# Patient Record
Sex: FEMALE | Race: BLACK OR AFRICAN AMERICAN | NOT HISPANIC OR LATINO | Employment: OTHER | URBAN - METROPOLITAN AREA
[De-identification: names, ages, dates, MRNs, and addresses within clinical notes are randomized per-mention and may not be internally consistent; named-entity substitution may affect disease eponyms.]

---

## 2022-06-09 DIAGNOSIS — S43.432D SUPERIOR GLENOID LABRUM LESION OF LEFT SHOULDER, SUBSEQUENT ENCOUNTER: Primary | ICD-10-CM

## 2023-03-24 ENCOUNTER — TELEPHONE (OUTPATIENT)
Dept: ORTHOPEDICS | Facility: CLINIC | Age: 68
End: 2023-03-24
Payer: MEDICARE

## 2023-03-24 NOTE — TELEPHONE ENCOUNTER
Called patient, no answer, left message on voicemail.      ----- Message from Laura Stover sent at 3/24/2023 11:55 AM CDT -----  Regarding: pt advice  Contact: [phone] or [phone]  Angelia Rondon calling regarding Patient Advice (message) for #would like to see a provider who does ortho scopic surgery. Please call

## 2023-03-29 ENCOUNTER — TELEPHONE (OUTPATIENT)
Dept: ORTHOPEDICS | Facility: CLINIC | Age: 68
End: 2023-03-29
Payer: MEDICARE

## 2023-03-29 DIAGNOSIS — M25.552 BILATERAL HIP PAIN: Primary | ICD-10-CM

## 2023-03-29 DIAGNOSIS — M25.551 BILATERAL HIP PAIN: Primary | ICD-10-CM

## 2023-03-29 NOTE — TELEPHONE ENCOUNTER
Left a voice mail for pt to return my call regarding her appointment tomorrow with Dr Ochoa.  Need to schedule pt for bilateral hip xrays prior to her appointment  
person

## 2023-03-29 NOTE — TELEPHONE ENCOUNTER
----- Message from Laura Stover sent at 3/29/2023  3:40 PM CDT -----  Regarding: missed call  Contact: [phone]  Angelia Vale calling regarding Patient Advice (message) for #returning a missed call from Ana Rosa. Please call

## 2023-03-30 ENCOUNTER — HOSPITAL ENCOUNTER (OUTPATIENT)
Dept: RADIOLOGY | Facility: HOSPITAL | Age: 68
Discharge: HOME | End: 2023-03-30
Attending: ORTHOPAEDIC SURGERY
Payer: MEDICARE

## 2023-03-30 ENCOUNTER — OFFICE VISIT (OUTPATIENT)
Dept: ORTHOPEDICS | Facility: CLINIC | Age: 68
End: 2023-03-30
Payer: MEDICARE

## 2023-03-30 VITALS — BODY MASS INDEX: 23.54 KG/M2 | HEIGHT: 67 IN | WEIGHT: 150 LBS

## 2023-03-30 DIAGNOSIS — M16.0 PRIMARY OSTEOARTHRITIS OF BOTH HIPS: Primary | ICD-10-CM

## 2023-03-30 DIAGNOSIS — M25.552 BILATERAL HIP PAIN: ICD-10-CM

## 2023-03-30 DIAGNOSIS — M25.551 BILATERAL HIP PAIN: ICD-10-CM

## 2023-03-30 PROCEDURE — 99203 PR OFFICE/OUTPT VISIT, NEW, LEVL III, 30-44 MIN: ICD-10-PCS | Mod: S$PBB,,, | Performed by: ORTHOPAEDIC SURGERY

## 2023-03-30 PROCEDURE — 73521 XR HIPS BILATERAL 2 VIEW INCL AP PELVIS: ICD-10-PCS | Mod: 26,,, | Performed by: RADIOLOGY

## 2023-03-30 PROCEDURE — 73521 X-RAY EXAM HIPS BI 2 VIEWS: CPT | Mod: 26,,, | Performed by: RADIOLOGY

## 2023-03-30 PROCEDURE — 73521 X-RAY EXAM HIPS BI 2 VIEWS: CPT | Mod: TC

## 2023-03-30 PROCEDURE — 99203 OFFICE O/P NEW LOW 30 MIN: CPT | Mod: S$PBB,,, | Performed by: ORTHOPAEDIC SURGERY

## 2023-03-30 PROCEDURE — 99999 PR PBB SHADOW E&M-EST. PATIENT-LVL III: ICD-10-PCS | Mod: PBBFAC,,, | Performed by: ORTHOPAEDIC SURGERY

## 2023-03-30 PROCEDURE — 99213 OFFICE O/P EST LOW 20 MIN: CPT | Mod: PBBFAC | Performed by: ORTHOPAEDIC SURGERY

## 2023-03-30 PROCEDURE — 99999 PR PBB SHADOW E&M-EST. PATIENT-LVL III: CPT | Mod: PBBFAC,,, | Performed by: ORTHOPAEDIC SURGERY

## 2023-03-30 RX ORDER — NAPROXEN 500 MG/1
500 TABLET ORAL 2 TIMES DAILY WITH MEALS
COMMUNITY

## 2023-03-30 RX ORDER — CELECOXIB 200 MG/1
200 CAPSULE ORAL
COMMUNITY
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
## 2024-10-07 ENCOUNTER — HOSPITAL ENCOUNTER (OUTPATIENT)
Dept: RADIOLOGY | Facility: HOSPITAL | Age: 69
Discharge: HOME | End: 2024-10-07
Attending: ORTHOPAEDIC SURGERY
Payer: MEDICARE

## 2024-10-07 ENCOUNTER — OFFICE VISIT (OUTPATIENT)
Dept: SPORTS MEDICINE | Facility: CLINIC | Age: 69
End: 2024-10-07
Payer: MEDICARE

## 2024-10-07 VITALS
HEART RATE: 60 BPM | SYSTOLIC BLOOD PRESSURE: 143 MMHG | WEIGHT: 142 LBS | DIASTOLIC BLOOD PRESSURE: 75 MMHG | HEIGHT: 67 IN | BODY MASS INDEX: 22.29 KG/M2

## 2024-10-07 DIAGNOSIS — M25.512 LEFT SHOULDER PAIN, UNSPECIFIED CHRONICITY: ICD-10-CM

## 2024-10-07 DIAGNOSIS — M19.012 ARTHRITIS OF LEFT ACROMIOCLAVICULAR JOINT: ICD-10-CM

## 2024-10-07 DIAGNOSIS — M75.22 BICEPS TENDINITIS OF LEFT SHOULDER: Primary | ICD-10-CM

## 2024-10-07 DIAGNOSIS — G89.29 CHRONIC LEFT SHOULDER PAIN: ICD-10-CM

## 2024-10-07 DIAGNOSIS — M25.512 CHRONIC LEFT SHOULDER PAIN: ICD-10-CM

## 2024-10-07 PROCEDURE — 99204 OFFICE O/P NEW MOD 45 MIN: CPT | Mod: S$PBB,,, | Performed by: ORTHOPAEDIC SURGERY

## 2024-10-07 PROCEDURE — 73030 X-RAY EXAM OF SHOULDER: CPT | Mod: TC,LT

## 2024-10-07 PROCEDURE — 99204 OFFICE O/P NEW MOD 45 MIN: CPT | Mod: PBBFAC,25 | Performed by: ORTHOPAEDIC SURGERY

## 2024-10-07 PROCEDURE — 99999 PR PBB SHADOW E&M-NEW PATIENT-LVL IV: CPT | Mod: PBBFAC,,, | Performed by: ORTHOPAEDIC SURGERY

## 2024-10-07 PROCEDURE — 73030 X-RAY EXAM OF SHOULDER: CPT | Mod: 26,LT,, | Performed by: RADIOLOGY

## 2024-10-07 RX ORDER — CELECOXIB 200 MG/1
200 CAPSULE ORAL 2 TIMES DAILY
Qty: 60 CAPSULE | Refills: 6 | Status: SHIPPED | OUTPATIENT
Start: 2024-10-07 | End: 2025-05-05
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
## 2024-10-14 ENCOUNTER — CLINICAL SUPPORT (OUTPATIENT)
Dept: REHABILITATION | Facility: HOSPITAL | Age: 69
End: 2024-10-14
Attending: ORTHOPAEDIC SURGERY
Payer: MEDICARE

## 2024-10-14 DIAGNOSIS — R68.89 IMPAIRED FUNCTION OF UPPER EXTREMITY: Primary | ICD-10-CM

## 2024-10-14 DIAGNOSIS — G89.29 CHRONIC LEFT SHOULDER PAIN: ICD-10-CM

## 2024-10-14 DIAGNOSIS — M25.512 CHRONIC LEFT SHOULDER PAIN: ICD-10-CM

## 2024-10-14 PROCEDURE — 97110 THERAPEUTIC EXERCISES: CPT | Mod: PN

## 2024-10-14 PROCEDURE — 97161 PT EVAL LOW COMPLEX 20 MIN: CPT | Mod: PN
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
## 2024-10-25 ENCOUNTER — CLINICAL SUPPORT (OUTPATIENT)
Dept: REHABILITATION | Facility: HOSPITAL | Age: 69
End: 2024-10-25
Payer: MEDICARE

## 2024-10-25 DIAGNOSIS — R68.89 IMPAIRED FUNCTION OF UPPER EXTREMITY: Primary | ICD-10-CM

## 2024-10-25 PROCEDURE — 97161 PT EVAL LOW COMPLEX 20 MIN: CPT | Mod: PN

## 2024-10-25 PROCEDURE — 97110 THERAPEUTIC EXERCISES: CPT | Mod: PN

## 2024-10-25 PROCEDURE — 97112 NEUROMUSCULAR REEDUCATION: CPT | Mod: KX,PN

## 2024-10-25 NOTE — PROGRESS NOTES
Physical Therapy Daily Treatment Note     Name: Angelia Vale  Clinic Number: [id number]    Therapy Diagnosis:   Encounter Diagnosis   Name Primary?    Impaired function of upper extremity Yes     Physician: Diane Elkins MD    Visit Date: 10/25/2024    Physician orders: PT Eval and Treat   Medical diagnosis from referral:   Free Text Diagnosis   M25.512,G89.29 (ICD-10-CM) - Chronic left shoulder pain   Evaluation date: 10/14/2024  Authorization period expiration: 10/7/24  Plan of care expiration: 12/23/2024  Reassessment due: 11/14/2024   Visit # / visits authorized: 1/1    Time In: 1300  Time Out: 1355    Total Billable Time: 53 minutes    Precautions: Standard    Subjective     Pt reports: no significant changes since initial visit.  She was compliant with home exercise program.  Response to previous treatment:  fair  Functional change: none    Pain: 4/10  Location: left shoulder      Objective     Patient received neuromuscular re-education activities to improve: Coordination, Kinesthetic, Sense, Proprioception, and Posture for 15 minutes. The following activities were included:     4 way GH isometrics 2 x 10    Pt received  therapeutic exercises to develop strength, endurance, ROM, and flexibility x 38 min      Manual GH PROM  Bilateral shoulder ext/rows 3 x 10 YTB  Supine wand flexion x 15  Upper trap stretch 20 sec x 4  Seated thoracic ext vs towel roll x 20  Scapular retraction 3 x 10  SL ER 3 x 10 2lbs    Home Exercises and Patient Education Provided     Education provided:   - PT role and POC  - HEP    Written Home Exercises Provided: Patient instructed to cont prior HEP.  Exercises were reviewed and Angelia was able to demonstrate them prior to the end of the session.  Angelia demonstrated good  understanding of the education provided.     See EMR under Patient Instructions for exercises provided 10/25/2024.    Assessment     Pt requires min cueing with postural correction with prescribed therex.  No  c/o increased discomfort with prescribed activities.  Good response to exercise progression consisting of RTC and scapular stabilization activities. Angelia is progressing well towards her goals.     Pt prognosis is Good.     Pt will continue to benefit from skilled outpatient physical therapy to address the deficits listed in the problem list box on initial evaluation, provide pt/family education and to maximize pt's level of independence in the home and community environment.     Pt's spiritual, cultural and educational needs considered and pt agreeable to plan of care and goals.     Anticipated barriers to physical therapy: none  GOALS:      Short Term Goals: 4 weeks  - Report decreased in pain at worse less than  <   / =  3  /10  to increase tolerance for functional mobility. Appropriate and ongoing  - Pt to improve range of motion by 25% to allow for improved functional mobility to allow for improvement in IADLs. Appropriate and ongoing  - Increased LUE MMT 1/2 grade to increase tolerance for ADL and work activities. Appropriate and ongoing  - Pt to report be conscious of impaired sitting and standing posture daily to decrease pain. Appropriate and ongoing  - Pt to tolerate HEP to improve ROM and independence with ADL's. Appropriate and ongoing     Long Term Goals: 8 weeks  - Report decreased in pain at worse less than  <   / =  0  /10  to increase tolerance for functional mobility. Appropriate and ongoing  - Patient will demonstrate improved overall function per FOTO Survey to 55% score or less. Appropriate and ongoing  - Increased LUE MMT 1 grade to increase tolerance for ADL and work activities. Appropriate and ongoing  - Pt to report and demonstrate proper posture in standing and sitting to decrease pain. Appropriate and ongoing  - Pt to be Independent with HEP to improve ROM and independence with ADL's. Appropriate and ongoing  - Pt to improve range of motion by 75% to allow for improved functional  mobility to allow for improvement in IADLs. Appropriate and ongoing    Plan     Continue with established  PT Plan of Care towards patient goals.      Gerard Humphrey, PT

## 2024-10-28 ENCOUNTER — CLINICAL SUPPORT (OUTPATIENT)
Dept: REHABILITATION | Facility: HOSPITAL | Age: 69
End: 2024-10-28
Payer: MEDICARE

## 2024-10-28 DIAGNOSIS — R68.89 IMPAIRED FUNCTION OF UPPER EXTREMITY: Primary | ICD-10-CM

## 2024-10-28 PROCEDURE — 97112 NEUROMUSCULAR REEDUCATION: CPT | Mod: PN,CQ

## 2024-10-28 PROCEDURE — 97110 THERAPEUTIC EXERCISES: CPT | Mod: PN,CQ

## 2024-11-04 ENCOUNTER — CLINICAL SUPPORT (OUTPATIENT)
Dept: REHABILITATION | Facility: HOSPITAL | Age: 69
End: 2024-11-04
Payer: MEDICARE

## 2024-11-04 DIAGNOSIS — R68.89 IMPAIRED FUNCTION OF UPPER EXTREMITY: Primary | ICD-10-CM

## 2024-11-04 DIAGNOSIS — M25.512 CHRONIC LEFT SHOULDER PAIN: ICD-10-CM

## 2024-11-04 DIAGNOSIS — G89.29 CHRONIC LEFT SHOULDER PAIN: ICD-10-CM

## 2024-11-04 PROCEDURE — 97112 NEUROMUSCULAR REEDUCATION: CPT | Mod: KX,PN,CQ

## 2024-11-04 NOTE — PROGRESS NOTES
"  Physical Therapy Daily Treatment Note     Name: Angelia Vale  Clinic Number: [id number]    Therapy Diagnosis:   Encounter Diagnoses   Name Primary?    Impaired function of upper extremity Yes    Chronic left shoulder pain      Physician: Diane Elkins MD    Visit Date: 11/4/2024    Physician orders: PT Eval and Treat   Medical diagnosis from referral:   Free Text Diagnosis   M25.512,G89.29 (ICD-10-CM) - Chronic left shoulder pain   Evaluation date: 10/14/2024  Authorization period expiration: 10/7/24  Plan of care expiration: 12/23/2024  Reassessment due: 11/14/2024   Visit # / visits authorized: 1/1    Time In: 1300  Time Out: 1355    Total Billable Time: 30 minutes 1:1  Total Time: 55 minutes    Precautions: Standard    Subjective     Pt reports: a little sore to Left shoulder, she says she must have sleeping on that side. She says depend of the movements, sometimes it can be 0/10 sometimes it's 10/10. She says she will have MRI to her Left shoulder this coming Wednesday.  She was compliant with home exercise program.  Response to previous treatment:  fair  Functional change: none    Pain: 0-10/10  Location: left shoulder      Objective     Patient received neuromuscular re-education activities to improve: Coordination, Kinesthetic, Sense, Proprioception, and Posture for 23 minutes. The following activities were included:     4 way GH isometrics +3x10x3"  Isometric IR 2x10x3"  Isometric ER 2x10x3"    Pt received  therapeutic exercises to develop strength, endurance, ROM, and flexibility x 33 min      Manual GH PROM  Bilateral shoulder ext/rows 3x10x3" RTB  Supine shoulder flexion with 2# wand 3x10  +Supine Chest press with 2# wand 3x10  +Supine Horizontal Abduction with RTB 2x10    Not perform this visit due to time:  SL ER 3x10 with 2lbs dumbbell  Upper trap stretch 20 sec x 4  Seated thoracic ext vs towel roll x 20  Scapular retraction 3 x 10      Home Exercises and Patient Education Provided     Education " provided:   - PT role and POC  - HEP    Written Home Exercises Provided: Patient instructed to cont prior HEP.  Exercises were reviewed and Angelia was able to demonstrate them prior to the end of the session.  Angelia demonstrated good  understanding of the education provided.     See EMR under Patient Instructions for exercises provided 11/4/2024.    Assessment     Patient presents to clinic with reporting a little sore to Left shoulder, she says she must have sleeping on that side. She says depend of the movements, sometimes it can be 0/10 sometimes it's 10/10. She says she will have MRI to her Left shoulder this coming Wednesday. Pt requires min cueing with postural correction with prescribed therex. No c/o increased discomfort with prescribed activities. Good response to exercise progression consisting of RTC and scapular stabilization activities. Patient tolerated session well with some soreness pain from exercising post treatment. Will cont to progress per patient tolerance. Angelia is progressing well towards her goals.     Pt prognosis is Good.     Pt will continue to benefit from skilled outpatient physical therapy to address the deficits listed in the problem list box on initial evaluation, provide pt/family education and to maximize pt's level of independence in the home and community environment.     Pt's spiritual, cultural and educational needs considered and pt agreeable to plan of care and goals.     Anticipated barriers to physical therapy: none  GOALS:      Short Term Goals: 4 weeks  - Report decreased in pain at worse less than  <   / =  3  /10  to increase tolerance for functional mobility. Appropriate and ongoing  - Pt to improve range of motion by 25% to allow for improved functional mobility to allow for improvement in IADLs. Appropriate and ongoing  - Increased LUE MMT 1/2 grade to increase tolerance for ADL and work activities. Appropriate and ongoing  - Pt to report be conscious of  impaired sitting and standing posture daily to decrease pain. Appropriate and ongoing  - Pt to tolerate HEP to improve ROM and independence with ADL's. Appropriate and ongoing     Long Term Goals: 8 weeks  - Report decreased in pain at worse less than  <   / =  0  /10  to increase tolerance for functional mobility. Appropriate and ongoing  - Patient will demonstrate improved overall function per FOTO Survey to 55% score or less. Appropriate and ongoing  - Increased LUE MMT 1 grade to increase tolerance for ADL and work activities. Appropriate and ongoing  - Pt to report and demonstrate proper posture in standing and sitting to decrease pain. Appropriate and ongoing  - Pt to be Independent with HEP to improve ROM and independence with ADL's. Appropriate and ongoing  - Pt to improve range of motion by 75% to allow for improved functional mobility to allow for improvement in IADLs. Appropriate and ongoing    Plan     Continue with established  PT Plan of Care towards patient goals.      Osmar To, PTA    11/4/2024

## 2024-11-07 ENCOUNTER — HOSPITAL ENCOUNTER (OUTPATIENT)
Dept: RADIOLOGY | Facility: HOSPITAL | Age: 69
Discharge: HOME | End: 2024-11-07
Attending: ORTHOPAEDIC SURGERY
Payer: MEDICARE

## 2024-11-07 DIAGNOSIS — M25.512 CHRONIC LEFT SHOULDER PAIN: ICD-10-CM

## 2024-11-07 DIAGNOSIS — G89.29 CHRONIC LEFT SHOULDER PAIN: ICD-10-CM

## 2024-11-07 PROCEDURE — 73222 MRI JOINT UPR EXTREM W/DYE: CPT | Mod: TC,LT

## 2024-11-07 PROCEDURE — 73222 MRI JOINT UPR EXTREM W/DYE: CPT | Mod: 26,LT,, | Performed by: RADIOLOGY

## 2024-11-07 PROCEDURE — A9585 GADOBUTROL INJECTION: HCPCS | Performed by: ORTHOPAEDIC SURGERY

## 2024-11-07 PROCEDURE — 25500020 PHARM REV CODE 255: Performed by: ORTHOPAEDIC SURGERY

## 2024-11-07 PROCEDURE — 63600175 PHARM REV CODE 636 W HCPCS: Performed by: ORTHOPAEDIC SURGERY

## 2024-11-07 RX ORDER — GADOBUTROL 604.72 MG/ML
7 INJECTION INTRAVENOUS
Status: COMPLETED | OUTPATIENT
Start: 2024-11-07 | End: 2024-11-07

## 2024-11-07 RX ORDER — LIDOCAINE HYDROCHLORIDE 10 MG/ML
2 INJECTION, SOLUTION INFILTRATION; PERINEURAL ONCE
Status: COMPLETED | OUTPATIENT
Start: 2024-11-07 | End: 2024-11-07

## 2024-11-07 RX ADMIN — IOHEXOL 9 ML: 300 INJECTION, SOLUTION INTRAVENOUS at 03:11

## 2024-11-07 RX ADMIN — LIDOCAINE HYDROCHLORIDE 2 ML: 10 INJECTION, SOLUTION INFILTRATION; PERINEURAL at 03:11

## 2024-11-07 RX ADMIN — GADOBUTROL 7 ML: 604.72 INJECTION INTRAVENOUS at 03:11

## 2024-11-08 ENCOUNTER — CLINICAL SUPPORT (OUTPATIENT)
Dept: REHABILITATION | Facility: HOSPITAL | Age: 69
End: 2024-11-08
Payer: MEDICARE

## 2024-11-08 DIAGNOSIS — G89.29 CHRONIC LEFT SHOULDER PAIN: Primary | ICD-10-CM

## 2024-11-08 DIAGNOSIS — M25.512 CHRONIC LEFT SHOULDER PAIN: Primary | ICD-10-CM

## 2024-11-08 DIAGNOSIS — R68.89 IMPAIRED FUNCTION OF UPPER EXTREMITY: ICD-10-CM

## 2024-11-08 PROCEDURE — 97110 THERAPEUTIC EXERCISES: CPT | Mod: KX,PN,CQ

## 2024-11-08 PROCEDURE — 97112 NEUROMUSCULAR REEDUCATION: CPT | Mod: KX,PN,CQ

## 2024-11-08 NOTE — PROGRESS NOTES
"  Physical Therapy Daily Treatment Note     Name: Angelia Vale  Clinic Number: [id number]    Therapy Diagnosis:   Encounter Diagnoses   Name Primary?    Impaired function of upper extremity     Chronic left shoulder pain Yes     Physician: Diane Elkins MD    Visit Date: 11/8/2024    Physician orders: PT Eval and Treat   Medical diagnosis from referral:   Free Text Diagnosis   M25.512,G89.29 (ICD-10-CM) - Chronic left shoulder pain   Evaluation date: 10/14/2024  Authorization period expiration: 10/7/24  Plan of care expiration: 12/23/2024  Reassessment due: 11/14/2024   Visit # / visits authorized: 4/20    Time In: 1300  Time Out: 1325    Total Billable Time: 25 minutes 1:1  Total Time: 25 minutes    Precautions: Standard    Subjective     Pt reports: patient has to leave early today. Patient states she got her MRI yesterday and found out that she has a tear to the shoulder, but she wants to schedule an appointment with her MD to discuss further treatment options for her left shoulder. She still has pain depending on certain angle she moves her shoulder.   She was compliant with home exercise program.  Response to previous treatment:  fair  Functional change: none    Pain: 0-10/10  Location: left shoulder      Objective     Patient received neuromuscular re-education activities to improve: Coordination, Kinesthetic, Sense, Proprioception, and Posture for 10 minutes. The following activities were included:     4 way GH isometrics +3x10x3"  Isometric IR 2x10x3"  Isometric ER 2x10x3"    Pt received  therapeutic exercises to develop strength, endurance, ROM, and flexibility x 15 min      Manual GH PROM = not performed   Bilateral shoulder ext/rows 3x10x3" RTB = not performed  Supine shoulder flexion with 2# wand 3x10  +Supine Chest press with 2# wand 3x10  +Supine Horizontal Abduction with RTB 2x10    Not perform this visit due to time:  SL ER 3x10 with 2lbs dumbbell  Upper trap stretch 20 sec x 4  Seated thoracic " ext vs towel roll x 20  Scapular retraction 3 x 10      Home Exercises and Patient Education Provided     Education provided:   - PT role and POC  - HEP    Written Home Exercises Provided: Patient instructed to cont prior HEP.  Exercises were reviewed and Angelia was able to demonstrate them prior to the end of the session.  Angelia demonstrated good  understanding of the education provided.     See EMR under Patient Instructions for exercises provided 11/8/2024.    Assessment   Patient recently had an MRI done yesterday and she found out that she has tear by reading the results herself. Advised patient to call and schedule with her MD to discuss her MRI results and plan for future treatment in regarding to her left shoulder pain. Due to patient has to leave early today, therefore, not all exercises were performed. Verbal cues and demonstration for proper techniques in standing isometric exercise specifically in extension. Otherwise, will add more exercises and perform more next visit as appropriate.     Angelia is progressing well towards her goals.   Pt prognosis is Good.     Pt will continue to benefit from skilled outpatient physical therapy to address the deficits listed in the problem list box on initial evaluation, provide pt/family education and to maximize pt's level of independence in the home and community environment.     Pt's spiritual, cultural and educational needs considered and pt agreeable to plan of care and goals.     Anticipated barriers to physical therapy: none  GOALS:      Short Term Goals: 4 weeks  - Report decreased in pain at worse less than  <   / =  3  /10  to increase tolerance for functional mobility. Appropriate and ongoing  - Pt to improve range of motion by 25% to allow for improved functional mobility to allow for improvement in IADLs. Appropriate and ongoing  - Increased LUE MMT 1/2 grade to increase tolerance for ADL and work activities. Appropriate and ongoing  - Pt to report  be conscious of impaired sitting and standing posture daily to decrease pain. Appropriate and ongoing  - Pt to tolerate HEP to improve ROM and independence with ADL's. Appropriate and ongoing     Long Term Goals: 8 weeks  - Report decreased in pain at worse less than  <   / =  0  /10  to increase tolerance for functional mobility. Appropriate and ongoing  - Patient will demonstrate improved overall function per FOTO Survey to 55% score or less. Appropriate and ongoing  - Increased LUE MMT 1 grade to increase tolerance for ADL and work activities. Appropriate and ongoing  - Pt to report and demonstrate proper posture in standing and sitting to decrease pain. Appropriate and ongoing  - Pt to be Independent with HEP to improve ROM and independence with ADL's. Appropriate and ongoing  - Pt to improve range of motion by 75% to allow for improved functional mobility to allow for improvement in IADLs. Appropriate and ongoing    Plan     Continue with established  PT Plan of Care towards patient goals.      Kary Arora, PTA    11/08/2024

## 2024-11-12 ENCOUNTER — CLINICAL SUPPORT (OUTPATIENT)
Dept: REHABILITATION | Facility: HOSPITAL | Age: 69
End: 2024-11-12
Payer: MEDICARE

## 2024-11-12 DIAGNOSIS — R68.89 IMPAIRED FUNCTION OF UPPER EXTREMITY: Primary | ICD-10-CM

## 2024-11-12 PROCEDURE — 97112 NEUROMUSCULAR REEDUCATION: CPT | Mod: PN

## 2024-11-12 PROCEDURE — 97110 THERAPEUTIC EXERCISES: CPT | Mod: PN

## 2024-11-12 NOTE — PROGRESS NOTES
"Physical Therapy Daily Treatment Note     Name: Angelia Vale  Clinic Number: [id number]    Therapy Diagnosis:   Encounter Diagnosis   Name Primary?    Impaired function of upper extremity Yes     Physician: Diane Elkins MD    Visit Date: 11/12/2024    Physician orders: PT Eval and Treat   Medical diagnosis from referral:   Free Text Diagnosis   M25.512,G89.29 (ICD-10-CM) - Chronic left shoulder pain   Evaluation date: 10/14/2024  Authorization period expiration: 10/7/24  Plan of care expiration: 12/23/2024  Reassessment due: 12/14/2024   Visit # / visits authorized: 5/20    Time In: 1300  Time Out: 1355    Total Billable Time: 55 minutes 1:1  Total Time: 25 minutes    Precautions: Standard    Subjective     Pt reports: continued pain from shoulder. It is intermittent and varies in severity.       She was compliant with home exercise program.  Response to previous treatment:  fair  Functional change: none    Pain: 0-10/10  Location: left shoulder      Objective     Shoulder Range of Motion:   ACTIVE ROM LEFT RIGHT   Flexion 160 160   Abduction 90 150   IR Functional T8 Functional T6   ER Functional  Functional       PASSIVE ROM LEFT RIGHT   Flexion 180 WFL   Abduction 120 WFL   IR/90deg 40 WFL   ER/90deg 90 WFL      STRENGTH LEFT RIGHT   Flexion 3+/5 5/5   Abduction 3+/5 5/5   Extension 3+/5 5/5   IR (neutral) 3+/5 5/5   ER (neutral) 3+/5 5/5   Middle Trap 4/5 4/5   Lower Trap 4/5 4/5   Elbow Flexion 4/5 4/5       Patient received neuromuscular re-education activities to improve: Coordination, Kinesthetic, Sense, Proprioception, and Posture for 15 minutes. The following activities were included:     4 way GH isometrics 3x10x3"  Isometric IR 2x10x3"  Isometric ER 2x10x3"    Pt received  therapeutic exercises to develop strength, endurance, ROM, and flexibility x 40 min      Manual GH PROM = not performed   Bilateral shoulder ext/rows 3x10x3" RTB = not performed  Supine shoulder flexion with 2# wand 3x10  Supine " Chest press with 2# wand 3x10  Supine Horizontal Abduction with RTB 2x10  SL ER 3x10 with 2lbs dumbbell  Upper trap stretch 20 sec x 4  Seated thoracic ext vs towel roll x 20  Scapular retraction 3 x 10      Home Exercises and Patient Education Provided     Education provided:   - PT role and POC  - HEP    Written Home Exercises Provided: Patient instructed to cont prior HEP.  Exercises were reviewed and Angelia was able to demonstrate them prior to the end of the session.  Angelia demonstrated good  understanding of the education provided.     See EMR under Patient Instructions for exercises provided 11/12/2024.    Assessment     Angelia tolerated treatment well today. Presents to clinic reporting continued pain that varies in severity. Re-measured ROM and strength with modest improvements at this time. Continued prior interventions to help improve stability and mobility of GH joint. Will continue to progress treatment per patient tolerance.      Angelia is progressing well towards her goals.   Pt prognosis is Good.     Pt will continue to benefit from skilled outpatient physical therapy to address the deficits listed in the problem list box on initial evaluation, provide pt/family education and to maximize pt's level of independence in the home and community environment.     Pt's spiritual, cultural and educational needs considered and pt agreeable to plan of care and goals.     Anticipated barriers to physical therapy: none  GOALS:      Short Term Goals: 4 weeks  - Report decreased in pain at worse less than  <   / =  3  /10  to increase tolerance for functional mobility. Appropriate and ongoing  - Pt to improve range of motion by 25% to allow for improved functional mobility to allow for improvement in IADLs. Appropriate and ongoing  - Increased LUE MMT 1/2 grade to increase tolerance for ADL and work activities. Appropriate and ongoing  - Pt to report be conscious of impaired sitting and standing posture daily  to decrease pain. Appropriate and ongoing  - Pt to tolerate HEP to improve ROM and independence with ADL's. Appropriate and ongoing     Long Term Goals: 8 weeks  - Report decreased in pain at worse less than  <   / =  0  /10  to increase tolerance for functional mobility. Appropriate and ongoing  - Patient will demonstrate improved overall function per FOTO Survey to 55% score or less. Appropriate and ongoing  - Increased LUE MMT 1 grade to increase tolerance for ADL and work activities. Appropriate and ongoing  - Pt to report and demonstrate proper posture in standing and sitting to decrease pain. Appropriate and ongoing  - Pt to be Independent with HEP to improve ROM and independence with ADL's. Appropriate and ongoing  - Pt to improve range of motion by 75% to allow for improved functional mobility to allow for improvement in IADLs. Appropriate and ongoing    Plan     Continue with established  PT Plan of Care towards patient goals.      Corbin Haddad, PT    11/12/2024

## 2024-11-19 ENCOUNTER — CLINICAL SUPPORT (OUTPATIENT)
Dept: REHABILITATION | Facility: HOSPITAL | Age: 69
End: 2024-11-19
Payer: MEDICARE

## 2024-11-19 DIAGNOSIS — R68.89 IMPAIRED FUNCTION OF UPPER EXTREMITY: ICD-10-CM

## 2024-11-19 DIAGNOSIS — M25.512 CHRONIC LEFT SHOULDER PAIN: Primary | ICD-10-CM

## 2024-11-19 DIAGNOSIS — G89.29 CHRONIC LEFT SHOULDER PAIN: Primary | ICD-10-CM

## 2024-11-19 PROCEDURE — 97530 THERAPEUTIC ACTIVITIES: CPT | Mod: KX,PN,CQ

## 2024-11-19 PROCEDURE — 97110 THERAPEUTIC EXERCISES: CPT | Mod: KX,PN,CQ

## 2024-11-19 PROCEDURE — 97112 NEUROMUSCULAR REEDUCATION: CPT | Mod: KX,PN,CQ

## 2024-11-19 NOTE — PROGRESS NOTES
"  Physical Therapy Daily Treatment Note     Name: Angelia Vale  Clinic Number: [id number]    Therapy Diagnosis:   Encounter Diagnoses   Name Primary?    Impaired function of upper extremity     Chronic left shoulder pain Yes     Physician: Diane Elkins MD    Visit Date: 11/19/2024    Physician orders: PT Eval and Treat   Medical diagnosis from referral:   Free Text Diagnosis   M25.512,G89.29 (ICD-10-CM) - Chronic left shoulder pain   Evaluation date: 10/14/2024  Authorization period expiration: 10/7/24  Plan of care expiration: 12/23/2024  Reassessment due: 11/14/2024   Visit # / visits authorized: 6/20    Time In: 1302  Time Out: 1345    Total Billable Time: 43 minutes 1:1 PTA  Total Time: 43 minutes    Precautions: Standard    Subjective     Pt reports: her left shoulder feels about the same, no improvement or changes. She has a follow up with her doctor on the 25th of this month to discuss about the MRI results and treatment options. Right now, she has some pain going down to the left elbow, not sure why.   She was compliant with home exercise program.  Response to previous treatment: about the same, no improvement or changes from last visit   Functional change: none    Pain: 3/10  Location: left shoulder      Objective     Angelia participated in dynamic functional therapeutic activities to improve functional performance for 8  minutes, including:    UBE as a warm up for 4 minutes forward/ 4 minutes backwards = total of 8 minutes       Patient received neuromuscular re-education activities to improve: Coordination, Kinesthetic, Sense, Proprioception, and Posture for 15 minutes. The following activities were included:     4 way GH isometrics 2x10x3"  Pendulum in circles for 60 seconds each direction   Standing Bilateral shoulder ext/rows 2x10x3" RTB       Pt received  therapeutic exercises to develop strength, endurance, ROM, and flexibility x 20 min      Supine shoulder flexion with 2# wand 2x10  Supine " Chest press with 2# wand 2x10  Supine serratus punches wand 2#, 2x10  Supine Horizontal Abduction with RTB 2x10      May resume or add exercise next visit:   Standing wall slide   Seated thoracic ext vs towel roll x 20      Angelia received the following manual therapy techniques: Joint mobilizations and Soft tissue Mobilization were applied to the: left shoulder for 00 minutes, including:  None performed this visit         Home Exercises and Patient Education Provided     Education provided:   - PT role and POC  - HEP    Written Home Exercises Provided: Patient instructed to cont prior HEP.  Exercises were reviewed and Angelia was able to demonstrate them prior to the end of the session.  Angelia demonstrated good  understanding of the education provided.     See EMR under Patient Instructions for exercises provided 11/19/2024.    Assessment   Patient expressed that her left shoulder remains the same, no major changes. She does have a follow up with her MD regarding the MRI results and discuss about treatment options.     At this time, we will still work on active assistive shoulder exercises and postural exercise to improve her symptoms. Verbal cues for proper techniques in isometrics exercise and standing rows for middle trap muscles activation. No major increase in pain to left shoulder post therapy. Will continue to progress patient as tolerated.     Angelia is progressing slowly towards her goals.   Pt prognosis is Good.     Pt will continue to benefit from skilled outpatient physical therapy to address the deficits listed in the problem list box on initial evaluation, provide pt/family education and to maximize pt's level of independence in the home and community environment.     Pt's spiritual, cultural and educational needs considered and pt agreeable to plan of care and goals.     Anticipated barriers to physical therapy: none  GOALS:      Short Term Goals: 4 weeks  - Report decreased in pain at worse less  than  <   / =  3  /10  to increase tolerance for functional mobility. Appropriate and ongoing  - Pt to improve range of motion by 25% to allow for improved functional mobility to allow for improvement in IADLs. Appropriate and ongoing  - Increased LUE MMT 1/2 grade to increase tolerance for ADL and work activities. Appropriate and ongoing  - Pt to report be conscious of impaired sitting and standing posture daily to decrease pain. Appropriate and ongoing  - Pt to tolerate HEP to improve ROM and independence with ADL's. Appropriate and ongoing     Long Term Goals: 8 weeks  - Report decreased in pain at worse less than  <   / =  0  /10  to increase tolerance for functional mobility. Appropriate and ongoing  - Patient will demonstrate improved overall function per FOTO Survey to 55% score or less. Appropriate and ongoing  - Increased LUE MMT 1 grade to increase tolerance for ADL and work activities. Appropriate and ongoing  - Pt to report and demonstrate proper posture in standing and sitting to decrease pain. Appropriate and ongoing  - Pt to be Independent with HEP to improve ROM and independence with ADL's. Appropriate and ongoing  - Pt to improve range of motion by 75% to allow for improved functional mobility to allow for improvement in IADLs. Appropriate and ongoing    Plan     Continue with established  PT Plan of Care towards patient goals.      Kary Arora, PTA    11/19/2024

## 2024-11-22 ENCOUNTER — CLINICAL SUPPORT (OUTPATIENT)
Dept: REHABILITATION | Facility: HOSPITAL | Age: 69
End: 2024-11-22
Payer: MEDICARE

## 2024-11-22 DIAGNOSIS — R68.89 IMPAIRED FUNCTION OF UPPER EXTREMITY: ICD-10-CM

## 2024-11-22 DIAGNOSIS — M25.512 CHRONIC LEFT SHOULDER PAIN: Primary | ICD-10-CM

## 2024-11-22 DIAGNOSIS — G89.29 CHRONIC LEFT SHOULDER PAIN: Primary | ICD-10-CM

## 2024-11-22 PROCEDURE — 97110 THERAPEUTIC EXERCISES: CPT | Mod: KX,PN,CQ

## 2024-11-22 NOTE — PROGRESS NOTES
"  Physical Therapy Daily Treatment Note     Name: Angelia Vale  Clinic Number: [id number]    Therapy Diagnosis:   Encounter Diagnoses   Name Primary?    Impaired function of upper extremity     Chronic left shoulder pain Yes     Physician: Diane Elkins MD    Visit Date: 11/22/2024    Physician orders: PT Eval and Treat   Medical diagnosis from referral:   Free Text Diagnosis   M25.512,G89.29 (ICD-10-CM) - Chronic left shoulder pain   Evaluation date: 10/14/2024  Authorization period expiration: 10/7/24  Plan of care expiration: 12/23/2024  Reassessment due: 12/14/2024    Visit # / visits authorized: 7/20    Time In: 1303  Time Out: 13:52    Total Billable Time: 30 minutes 1:1 PTA  Total Time: 49 minutes    Precautions: Standard    Subjective     Pt reports: she's going to see her doctor on Monday regarding the left shoulder pain.   She was compliant with home exercise program.  Response to previous treatment: about the same, no improvement or changes from last visit   Functional change: none    Pain: 3/10  Location: left shoulder      Objective     Angelia participated in dynamic functional therapeutic activities to improve functional performance for 8  minutes, including:    UBE as a warm up for 4 minutes forward/ 4 minutes backwards = total of 8 minutes       Patient received neuromuscular re-education activities to improve: Coordination, Kinesthetic, Sense, Proprioception, and Posture for 15 minutes. The following activities were included:     4 way GH isometrics 2x10x3"  Pendulum in circles for 60 seconds each direction   Standing Bilateral shoulder ext/rows 2x10x3" GTB       Pt received  therapeutic exercises to develop strength, endurance, ROM, and flexibility x 26 min      Supine shoulder flexion with 2# wand 2x10  Supine Chest press with 2# wand 2x10  Supine serratus punches wand 2#, 2x10  Supine Horizontal Abduction with GTB 2x10  Seated thoracic ext vs towel roll x 20      Angelia received the " following manual therapy techniques: Joint mobilizations and Soft tissue Mobilization were applied to the: left shoulder for 00 minutes, including:  None performed this visit         Home Exercises and Patient Education Provided     Education provided:   - PT role and POC  - HEP    Written Home Exercises Provided: Patient instructed to cont prior HEP.  Exercises were reviewed and Angelia was able to demonstrate them prior to the end of the session.  Angelia demonstrated good  understanding of the education provided.     See EMR under Patient Instructions for exercises provided 11/22/2024.    Assessment   Patient expressed that her left shoulder remains the same, no major changes. She does have a follow up with her MD regarding the MRI results and discuss about treatment options.    Again, we will still work on active assistive shoulder exercises and postural exercise to improve her symptoms. Verbal cues for proper techniques in isometrics exercise and standing rows for middle trap muscles activation. Able to increase resistance this visit which patient appears to tolerate this without major pain in the shoulder. No major increase in pain to left shoulder post therapy. So far, it appears she is limited in AROM abduction to left shoulder. Shoulder flexion, external rotation, and internal rotation, appears to be within normal range. Will progress as tolerated and follow up with patient regarding her visit with her doctor.     Angelia is progressing slowly towards her goals.   Pt prognosis is Good.     Pt will continue to benefit from skilled outpatient physical therapy to address the deficits listed in the problem list box on initial evaluation, provide pt/family education and to maximize pt's level of independence in the home and community environment.     Pt's spiritual, cultural and educational needs considered and pt agreeable to plan of care and goals.     Anticipated barriers to physical therapy: none  GOALS:       Short Term Goals: 4 weeks  - Report decreased in pain at worse less than  <   / =  3  /10  to increase tolerance for functional mobility. Appropriate and ongoing  - Pt to improve range of motion by 25% to allow for improved functional mobility to allow for improvement in IADLs. Appropriate and ongoing  - Increased LUE MMT 1/2 grade to increase tolerance for ADL and work activities. Appropriate and ongoing  - Pt to report be conscious of impaired sitting and standing posture daily to decrease pain. Appropriate and ongoing  - Pt to tolerate HEP to improve ROM and independence with ADL's. Appropriate and ongoing     Long Term Goals: 8 weeks  - Report decreased in pain at worse less than  <   / =  0  /10  to increase tolerance for functional mobility. Appropriate and ongoing  - Patient will demonstrate improved overall function per FOTO Survey to 55% score or less. Appropriate and ongoing  - Increased LUE MMT 1 grade to increase tolerance for ADL and work activities. Appropriate and ongoing  - Pt to report and demonstrate proper posture in standing and sitting to decrease pain. Appropriate and ongoing  - Pt to be Independent with HEP to improve ROM and independence with ADL's. Appropriate and ongoing  - Pt to improve range of motion by 75% to allow for improved functional mobility to allow for improvement in IADLs. Appropriate and ongoing    Plan     Continue with established  PT Plan of Care towards patient goals.      Kary Arora, PTA    11/22/2024

## 2024-11-25 ENCOUNTER — OFFICE VISIT (OUTPATIENT)
Dept: SPORTS MEDICINE | Facility: CLINIC | Age: 69
End: 2024-11-25
Payer: MEDICARE

## 2024-11-25 VITALS
HEART RATE: 67 BPM | BODY MASS INDEX: 22.79 KG/M2 | DIASTOLIC BLOOD PRESSURE: 75 MMHG | WEIGHT: 145.5 LBS | SYSTOLIC BLOOD PRESSURE: 150 MMHG

## 2024-11-25 DIAGNOSIS — G89.29 CHRONIC LEFT SHOULDER PAIN: ICD-10-CM

## 2024-11-25 DIAGNOSIS — S43.432D SUPERIOR GLENOID LABRUM LESION OF LEFT SHOULDER, SUBSEQUENT ENCOUNTER: Primary | ICD-10-CM

## 2024-11-25 DIAGNOSIS — M75.22 BICEPS TENDINITIS OF LEFT SHOULDER: ICD-10-CM

## 2024-11-25 DIAGNOSIS — M75.22 BICEPS TENDINITIS OF LEFT SHOULDER: Primary | ICD-10-CM

## 2024-11-25 DIAGNOSIS — M25.512 CHRONIC LEFT SHOULDER PAIN: ICD-10-CM

## 2024-11-25 DIAGNOSIS — M75.102 ROTATOR CUFF SYNDROME OF LEFT SHOULDER: ICD-10-CM

## 2024-11-25 DIAGNOSIS — M75.42 ROTATOR CUFF IMPINGEMENT SYNDROME OF LEFT SHOULDER: ICD-10-CM

## 2024-11-25 DIAGNOSIS — S43.432D SUPERIOR GLENOID LABRUM LESION OF LEFT SHOULDER, SUBSEQUENT ENCOUNTER: ICD-10-CM

## 2024-11-25 PROBLEM — S43.432A SUPERIOR GLENOID LABRUM LESION OF LEFT SHOULDER: Status: ACTIVE | Noted: 2024-11-25

## 2024-11-25 PROCEDURE — 99999 PR PBB SHADOW E&M-EST. PATIENT-LVL II: CPT | Mod: PBBFAC,,, | Performed by: ORTHOPAEDIC SURGERY

## 2024-11-25 PROCEDURE — 99215 OFFICE O/P EST HI 40 MIN: CPT | Mod: S$PBB,,, | Performed by: ORTHOPAEDIC SURGERY

## 2024-11-25 PROCEDURE — 99212 OFFICE O/P EST SF 10 MIN: CPT | Mod: PBBFAC | Performed by: ORTHOPAEDIC SURGERY

## 2024-11-25 NOTE — PROGRESS NOTES
Subjective:          Chief Complaint: Angelia Vale is a 69 y.o. female who had no chief complaint listed for this encounter.  History of Present Illness    CHIEF COMPLAINT:  - Angelia presents today for follow-up evaluation of shoulder pain.    HPI:  Angelia presents for evaluation of ongoing shoulder pain. She reports that the pain is extremely severe when she moves her shoulder in certain positions. She indicates the top of her shoulder as the location of the pain, which the practitioner identifies as the rotator cuff area. She has been attending physical therapy but is unsure of its effectiveness. She mentions having received an injection in the past but found it to be only temporarily effective. Her symptoms appear to be chronic, as she has had this issue for some time, evidenced by her history of physical therapy and previous treatments.    She denies any improvement in shoulder pain with current treatment measures.       Objective:        General: Angelia is well-developed, well-nourished, appears stated age, in no acute distress, alert and oriented to time, place and person.     Right Shoulder Exam   Right shoulder exam is normal.     Inspection/Observation   Swelling: absent  Bruising: absent  Scars: absent  Deformity: absent  Scapular Winging: absent  Scapular Dyskinesia: negative  Atrophy: absent     Tenderness   The patient is experiencing no tenderness.     Range of Motion   Active abduction:  90 normal   Passive abduction:  100 normal   Extension:  0 normal   Forward Flexion:  180 normal   Forward Elevation: 180 normal  Adduction: 40 normal  External Rotation 0 degrees:  50 normal   External Rotation 90 degrees: 90 normal  Internal rotation 0 degrees:  T8 normal   Internal rotation 90 degrees:  30 normal      Tests & Signs   Apprehension: negative  Cross arm: negative  Drop arm: negative  Oliveira test: negative  Impingement: negative  Sulcus: absent  Anterosuperior Escape: negative  Lag Sign 0  degrees: negative  Lag Sign 90 degrees: negative  Lift Off Sign: negative  Belly Press: negative  Active Compression Test (Dunklin's Sign): negative  Yergason's Test: negative  Speed's Test: negative  Anterior Drawer Test: 1+   Posterior Drawer Test: 1+  Relocation 90 degrees: negative  Relocation > 90 degrees: negative  Bear Hug: negative  Moving Valgus: negative  Jerk Test: negative     Other   Sensation: normal     Left Shoulder Exam      Inspection/Observation   Swelling: absent  Bruising: absent  Scars: absent  Deformity: absent  Scapular Winging: absent  Scapular Dyskinesia: negative  Atrophy: absent     Tenderness   The patient is tender to palpation of the acromioclavicular joint, supraspinatus, greater tuberosity, biceps tendon and acromion.     Range of Motion   Active abduction:  150 abnormal   Passive abduction:  170 abnormal   Extension:  0 normal   Forward Flexion:  150 abnormal   Forward Elevation: 150 abnormal  Adduction: 30 abnormal  External Rotation 0 degrees:  50 normal   External Rotation 90 degrees: 90 normal  Internal rotation 0 degrees:  T8 normal   Internal rotation 90 degrees:  0 abnormal      Tests & Signs   Apprehension: negative  Cross arm: positive  Drop arm: negative  Oliveira test: positive  Impingement: positive  Sulcus: absent  Anterosuperior Escape: negative  Lag Sign 0 degrees: negative  Lag Sign 90 degrees: negative  Lift Off Sign: positive  Belly Press: positive  Active Compression test (Dunklin's Sign): negative  Yergasons's Test: negative  Speed's Test: positive  Anterior Drawer Test: 0  Posterior Drawer Test: 0  Relocation 90 degrees: negative  Relocation > 90 degrees: negative  Bear Hug: positive  Moving Valgus: negative  Jerk Test: negative     Other   Sensation: normal         Muscle Strength   Right Upper Extremity   Shoulder Abduction: 5/5   Shoulder Internal Rotation: 5/5   Shoulder External Rotation: 5/5   Supraspinatus: 5/5   Subscapularis: 5/5   Biceps: 5/5   Left  Upper Extremity  Shoulder Abduction: 5/5   Shoulder Internal Rotation: 5/5   Shoulder External Rotation: 5/5   Supraspinatus: 5/5   Subscapularis: 5/5   Biceps: 5/5      Reflexes      Left Side  Biceps:  2+  Triceps:  2+  Brachioradialis:  2+     Right Side   Biceps:  2+  Triceps:  2+  Brachioradialis:  2+     Vascular Exam      Right Pulses  Radial:                    2+  Left Pulses  Radial:                    2+    Capillary Refill  Right Hand: normal capillary refill  Left Hand: normal capillary refill       EXAMINATION:  MRI ARTHROGRAM SHOULDER WITH CONTRAST LEFT     CLINICAL HISTORY:  Shoulder pain, bursitis suspected, xray done;  Pain in left shoulder     TECHNIQUE:  Multiplanar, multisequence imaging of the   shoulder was performed after the administration of intra-articular gadolinium contrast.  Following sequences performed: Axial and coronal T1 FS, coronal and sagittal T2 FS Winkler, and ABER T1 FS.     COMPARISON:  Shoulder fluoroscopic arthrogram from 11/7/2024     FINDINGS:  ROTATOR CUFF:     Supraspinatus: Intact. No tendinosis.     Infraspinatus: Intact. No tendinosis.     Subscapularis: Intact. No tendinosis.     Teres Minor: Intact. No tendinosis.     LABRUM: Intrasubstance tear superior labrum extending from anterior to posterior.   Biceps-labral anchor is abnormal appearance possibly iatrogenic relating to arthrogram procedure.  Tear of the biceps anchor not excluded.  Anterior inferior labrum appears intact.     LONG HEAD BICEPS TENDON:  Located and intact.No tendinosis or tenosynovitis. Rotator Interval is normal. Biceps pulley is intact.     IGHL: Intact     Superior and middle glenohumeral ligaments are normal.  Coracohumeral ligament is normal.     BONES: No evident fracture.Visualized marrow within normal limits. AC joint demonstrates normal alignment with no significant hypertrophy.  No osteo-acromial outlet narrowing due to lateral downsloping of acromion or acromial spur.  Humeral head is  centered relative to the glenoid.     CARTILAGE: Preserved without significant arthritic changes.No synovial abnormality or intra-articular loose bodies. Glenoid fossa demonstrates no sclerosis.     MUSCLES:  Normal bulk and signal.     MISCELLANEOUS: Subacromial/subdeltoid bursa intact.  No axillary lymphadenopathy.     Impression:     No evidence for rotator cuff tear.  Suspect superior labral tear extending anterior to posterior (SLAP).        Electronically signed by:Sudarshan Snyder MD  Date:                                            11/07/2024        Assessment:       Encounter Diagnoses   Name Primary?    Biceps tendinitis of left shoulder     Chronic left shoulder pain     Superior glenoid labrum lesion of left shoulder, subsequent encounter Yes    Rotator cuff syndrome of left shoulder     Rotator cuff impingement syndrome of left shoulder           Plan:       Assessment & Plan    PROCEDURES:  - Discussed arthroscopic surgery as a treatment option. Angelia expressed interest in having the procedure done before the end of the year. Arthroscopic surgery scheduled for sometime in December, after Thanksgiving.         We reviewed with Angelia today, the pathology and natural history of her diagnosis. We have discussed a variety of treatment options including medications, physical therapy and other alternative treatments. I also explained the indications, risks and benefits of surgery. After discussion, Angelia decided to proceed with surgery. The decision was made to go forward with:     Left Shoulder:    91102 Biceps tenodesis  01868 Arthroscopy, decompression of subacromial space with partial acromioplasty with or without coracoacromial release  59968 Arthroscopy, shoulder, debridement, limited      Clearance Medically Necessary:  Yes     Pre-Op Center Clearance Medically Necessary: Yes     The details of the surgical procedure were explained, including the location of probable incisions and a description of  likely hardware and/or grafts to be used.  The patient understands the likely convalescence after surgery.  Also, we have thoroughly discussed the risks, benefits and alternatives to surgery, including, but not limited to, the risk of infection, joint stiffness, blood clot (including DVT and/or pulmonary embolus), neurologic and vascular injury.  It was explained that, if tissue has been repaired or reconstructed, there is a chance of failure, which may require further management.      All of the patient's questions were answered and informed consent was obtained. The patient will contact us if they have any questions or concerns in the interim. She has been requested to continue PT in the mean time.     Date of surgery - 12/10/24.                          [facility] patient questionnaires have been collected today.

## 2024-11-26 ENCOUNTER — TELEPHONE (OUTPATIENT)
Dept: PREADMISSION TESTING | Facility: HOSPITAL | Age: 69
End: 2024-11-26
Payer: MEDICARE

## 2024-11-27 ENCOUNTER — PATIENT MESSAGE (OUTPATIENT)
Dept: PREADMISSION TESTING | Facility: HOSPITAL | Age: 69
End: 2024-11-27
Payer: MEDICARE

## 2024-11-29 ENCOUNTER — HOSPITAL ENCOUNTER (OUTPATIENT)
Dept: RADIOLOGY | Facility: HOSPITAL | Age: 69
Discharge: HOME | End: 2024-11-29
Attending: PHYSICIAN ASSISTANT
Payer: MEDICARE

## 2024-11-29 ENCOUNTER — OFFICE VISIT (OUTPATIENT)
Dept: SPORTS MEDICINE | Facility: CLINIC | Age: 69
End: 2024-11-29
Payer: MEDICARE

## 2024-11-29 VITALS — WEIGHT: 145.5 LBS | BODY MASS INDEX: 22.84 KG/M2 | HEIGHT: 67 IN

## 2024-11-29 DIAGNOSIS — M54.16 LUMBAR RADICULOPATHY, CHRONIC: ICD-10-CM

## 2024-11-29 DIAGNOSIS — M25.562 LEFT KNEE PAIN, UNSPECIFIED CHRONICITY: ICD-10-CM

## 2024-11-29 DIAGNOSIS — M25.562 LEFT KNEE PAIN, UNSPECIFIED CHRONICITY: Primary | ICD-10-CM

## 2024-11-29 PROCEDURE — 73564 X-RAY EXAM KNEE 4 OR MORE: CPT | Mod: 26,50,, | Performed by: RADIOLOGY

## 2024-11-29 PROCEDURE — 73564 X-RAY EXAM KNEE 4 OR MORE: CPT | Mod: TC,50

## 2024-11-29 PROCEDURE — 99213 OFFICE O/P EST LOW 20 MIN: CPT | Mod: PBBFAC,25 | Performed by: PHYSICIAN ASSISTANT

## 2024-11-29 PROCEDURE — 99999 PR PBB SHADOW E&M-EST. PATIENT-LVL III: CPT | Mod: PBBFAC,,, | Performed by: PHYSICIAN ASSISTANT
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
## 2024-11-30 ENCOUNTER — HOSPITAL ENCOUNTER (OUTPATIENT)
Dept: RADIOLOGY | Facility: HOSPITAL | Age: 69
Discharge: HOME | End: 2024-11-30
Attending: PHYSICIAN ASSISTANT
Payer: MEDICARE

## 2024-11-30 DIAGNOSIS — M54.16 LUMBAR RADICULOPATHY, CHRONIC: ICD-10-CM

## 2024-11-30 PROCEDURE — 72148 MRI LUMBAR SPINE W/O DYE: CPT | Mod: TC

## 2024-11-30 PROCEDURE — 72148 MRI LUMBAR SPINE W/O DYE: CPT | Mod: 26,,, | Performed by: RADIOLOGY

## 2024-12-03 ENCOUNTER — OFFICE VISIT (OUTPATIENT)
Dept: SPINE | Facility: CLINIC | Age: 69
End: 2024-12-03
Payer: MEDICARE

## 2024-12-03 VITALS
OXYGEN SATURATION: 100 % | DIASTOLIC BLOOD PRESSURE: 70 MMHG | SYSTOLIC BLOOD PRESSURE: 164 MMHG | HEIGHT: 67 IN | RESPIRATION RATE: 19 BRPM | BODY MASS INDEX: 22.84 KG/M2 | HEART RATE: 65 BPM | WEIGHT: 145.5 LBS

## 2024-12-03 DIAGNOSIS — M47.816 SPONDYLOSIS OF LUMBAR REGION WITHOUT MYELOPATHY OR RADICULOPATHY: ICD-10-CM

## 2024-12-03 DIAGNOSIS — M51.360 DEGENERATION OF INTERVERTEBRAL DISC OF LUMBAR REGION WITH DISCOGENIC BACK PAIN: ICD-10-CM

## 2024-12-03 DIAGNOSIS — M54.9 DORSALGIA: Primary | ICD-10-CM

## 2024-12-03 PROCEDURE — 99213 OFFICE O/P EST LOW 20 MIN: CPT | Mod: PBBFAC | Performed by: NURSE PRACTITIONER

## 2024-12-03 PROCEDURE — 99999 PR PBB SHADOW E&M-EST. PATIENT-LVL III: CPT | Mod: PBBFAC,,, | Performed by: NURSE PRACTITIONER

## 2024-12-03 PROCEDURE — 99204 OFFICE O/P NEW MOD 45 MIN: CPT | Mod: S$PBB,,, | Performed by: NURSE PRACTITIONER

## 2024-12-03 NOTE — PROGRESS NOTES
Subjective:     Patient ID: Angelia Vale is a 69 y.o. female.    Chief Complaint: No chief complaint on file.    HPI Ms. Vale is a 69 year old female here for evaluation of left leg pain    C/o left leg pain radiating to the ankle  Occasional back pain   Starting PT on her back 12/6/24    Lumbar MRI 2024    FINDINGS:  Alignment: Normal.     Vertebrae: No fracture or marrow infiltrative process.     Discs: Multilevel degenerative disc desiccation with mild disc height loss at L4-L5.     Cord: Conus terminates at L1-L2 and appears unremarkable.  Cauda equina appears unremarkable.     Degenerative findings:     T12-L1: Small diffuse disc bulge.  No spinal canal stenosis or neural foraminal narrowing.     L1-L2: No spinal canal stenosis or neural foraminal narrowing.     L2-L3: Small diffuse disc bulge and mild facet arthropathy.  No spinal canal stenosis or neural foraminal narrowing.     L3-L4: Diffuse disc bulge and mild facet arthropathy.  No spinal canal stenosis or neural foraminal narrowing.     L4-L5: Diffuse disc bulge with a left foraminal disc protrusion abutting the exiting left L4 nerve root (09:33).  No central canal stenosis.  Mild left foraminal narrowing.  Is     L5-S1: Small diffuse disc bulge and mild facet arthropathy.  No spinal canal stenosis or neural foraminal narrowing.     Paraspinal muscles & soft tissues: Unremarkable.     Impression:     L4-L5 mild diffuse disc bulge and left foraminal disc protrusion/annular fissure possibly abutting the exiting left L4 nerve root, to correlate with symptoms referable to a left L4 radiculopathy.    No past medical history on file.    No past surgical history on file.    No family history on file.    Social History     Socioeconomic History    Marital status:    Tobacco Use    Smoking status: Never    Smokeless tobacco: Never     Social Drivers of Health     Financial Resource Strain: Low Risk  (10/7/2024)    Overall Financial Resource Strain  (CARDIA)     Difficulty of Paying Living Expenses: Not hard at all   Food Insecurity: No Food Insecurity (10/7/2024)    Hunger Vital Sign     Worried About Running Out of Food in the Last Year: Never true     Ran Out of Food in the Last Year: Never true   Transportation Needs: No Transportation Needs (4/25/2022)    Received from [facility]    PRAPARE - Transportation     Lack of Transportation (Medical): No     Lack of Transportation (Non-Medical): No   Physical Activity: Insufficiently Active (10/7/2024)    Exercise Vital Sign     Days of Exercise per Week: 3 days     Minutes of Exercise per Session: 30 min   Stress: Patient Declined (10/7/2024)    [employer] - Occupational Stress Questionnaire     Feeling of Stress : Patient declined   Housing Stability: Unknown (10/7/2024)    Housing Stability Vital Sign     Unable to Pay for Housing in the Last Year: No       Current Outpatient Medications   Medication Sig Dispense Refill    celecoxib (CELEBREX) 200 MG capsule Take 200 mg by mouth.      celecoxib (CELEBREX) 200 MG capsule Take 1 capsule (200 mg total) by mouth 2 (two) times daily. (Patient not taking: Reported on 11/29/2024) 60 capsule 6    naproxen (NAPROSYN) 500 MG tablet Take 500 mg by mouth 2 (two) times daily with meals.       No current facility-administered medications for this visit.       Review of patient's allergies indicates:   Allergen Reactions    Pcn [penicillins] Hives and Swelling    Penicillins          Review of Systems   Constitutional: Negative for fever.   Cardiovascular:  Negative for chest pain.   Respiratory:  Negative for shortness of breath.    Musculoskeletal:  Positive for back pain and joint pain.   Gastrointestinal:  Negative for bowel incontinence.   Genitourinary:  Negative for bladder incontinence.   Neurological:  Negative for numbness and paresthesias.        Objective:     General: Angelia is well-developed,  well-nourished, appears stated age, in no acute distress, alert and oriented to time, place and person.     General    Vitals reviewed.  Constitutional: She is oriented to person, place, and time. She appears well-developed and well-nourished.   HENT:   Head: Atraumatic.   Nose: Nose normal.   Eyes: Conjunctivae are normal.   Cardiovascular:  Normal rate.            Pulmonary/Chest: Effort normal.   Neurological: She is alert and oriented to person, place, and time.   Psychiatric: She has a normal mood and affect. Her behavior is normal. Judgment and thought content normal.     General Musculoskeletal Exam   Gait: normal     Back (L-Spine & T-Spine) / Neck (C-Spine) Exam     Back (L-Spine & T-Spine) Range of Motion   Extension:  10   Flexion:  90     Spinal Sensation   Right Side Sensation  L-Spine Level: normal  S-Spine Level: normal  Left Side Sensation  L-Spine Level: normal  S-Spine Level: normal    Other   She has no scoliosis .  Spinal Kyphosis:  Absent      Muscle Strength   Right Upper Extremity   Biceps: 5/5   Deltoid:  5/5  Triceps:  5/5  Left Upper Extremity  Biceps: 5/5   Deltoid:  5/5  Triceps:  5/5  Right Lower Extremity   Hip Flexion: 5/5   Quadriceps:  5/5   Anterior tibial:  5/5   EHL:  5/5  Left Lower Extremity   Hip Flexion: 5/5   Quadriceps:  5/5   Anterior tibial:  5/5   EHL:  5/5    Reflexes     Left Side  Biceps:  2+  Brachioradialis:  2+  Achilles:  2+    Right Side   Biceps:  2+  Brachioradialis:  2+  Achilles:  2+    Vascular Exam     Right Pulses        Carotid:                  2+    Left Pulses        Carotid:                  2+          Assessment:     1. Dorsalgia    2. Degeneration of intervertebral disc of lumbar region with discogenic back pain    3. Spondylosis of lumbar region without myelopathy or radiculopathy         Plan:        Prior records reviewed  We discussed back pain and the nature of back pain.  We discussed that it is not one thing that causes the pain but an  accumulation of multiple things that we do.   Start PT 12/6/24   We discussed posture sitting and the importance of trying to sit better.    We discussed the benefits of therapy and exercise and continuing to move.   Virtual followup in 8 weeks    More than 50% of the total time  of 45 minutes was spent face to face in counseling on diagnosis and treatment options. I also counseled patient  on common and most usual side effect of prescribed medications. Preparing to see the patient (eg, review of tests), Obtaining and/or reviewing separately obtained history, documenting clinical information in the electronic or other health record, independently interpreting results (not separately reported) and communicating results to the patient/family/caregiver, or care coordination (not separately reported). I reviewed Primary care , and other specialty's notes to better coordinate patient's care. All questions were answered, and patient voiced understanding.      Follow-up: No follow-ups on file. If there are any questions prior to this, the patient was instructed to contact the office.

## 2024-12-04 ENCOUNTER — OFFICE VISIT (OUTPATIENT)
Dept: SPORTS MEDICINE | Facility: CLINIC | Age: 69
End: 2024-12-04
Payer: MEDICARE

## 2024-12-04 VITALS
HEIGHT: 67 IN | WEIGHT: 145 LBS | HEART RATE: 65 BPM | BODY MASS INDEX: 22.76 KG/M2 | DIASTOLIC BLOOD PRESSURE: 70 MMHG | SYSTOLIC BLOOD PRESSURE: 154 MMHG

## 2024-12-04 DIAGNOSIS — M75.22 BICEPS TENDINITIS OF LEFT SHOULDER: Primary | ICD-10-CM

## 2024-12-04 DIAGNOSIS — M75.102 ROTATOR CUFF SYNDROME OF LEFT SHOULDER: ICD-10-CM

## 2024-12-04 DIAGNOSIS — S43.432D SUPERIOR GLENOID LABRUM LESION OF LEFT SHOULDER, SUBSEQUENT ENCOUNTER: ICD-10-CM

## 2024-12-04 PROCEDURE — 99499 UNLISTED E&M SERVICE: CPT | Mod: S$PBB,,, | Performed by: PHYSICIAN ASSISTANT

## 2024-12-04 PROCEDURE — 99213 OFFICE O/P EST LOW 20 MIN: CPT | Mod: PBBFAC | Performed by: PHYSICIAN ASSISTANT

## 2024-12-04 PROCEDURE — 99999 PR PBB SHADOW E&M-EST. PATIENT-LVL III: CPT | Mod: PBBFAC,,, | Performed by: PHYSICIAN ASSISTANT

## 2024-12-04 RX ORDER — CELECOXIB 200 MG/1
200 CAPSULE ORAL 2 TIMES DAILY WITH MEALS
Qty: 60 CAPSULE | Refills: 0 | Status: SHIPPED | OUTPATIENT
Start: 2024-12-04 | End: 2024-12-04

## 2024-12-04 RX ORDER — SODIUM CHLORIDE 0.9 % (FLUSH) 0.9 %
10 SYRINGE (ML) INJECTION CONTINUOUS
OUTPATIENT
Start: 2024-12-04

## 2024-12-04 RX ORDER — MUPIROCIN 20 MG/G
OINTMENT TOPICAL
OUTPATIENT
Start: 2024-12-04

## 2024-12-04 RX ORDER — ROPIVACAINE/EPI/CLONIDINE/KET 2.46-0.005
SYRINGE (ML) INJECTION ONCE
OUTPATIENT
Start: 2024-12-04 | End: 2024-12-04

## 2024-12-04 RX ORDER — PROMETHAZINE HYDROCHLORIDE 25 MG/1
25 TABLET ORAL EVERY 4 HOURS PRN
Qty: 30 TABLET | Refills: 0 | Status: SHIPPED | OUTPATIENT
Start: 2024-12-04 | End: 2024-12-04

## 2024-12-04 RX ORDER — ASPIRIN 325 MG
TABLET ORAL
Qty: 42 TABLET | Refills: 0 | Status: SHIPPED | OUTPATIENT
Start: 2024-12-04 | End: 2024-12-04

## 2024-12-04 RX ORDER — PREGABALIN 75 MG/1
75 CAPSULE ORAL 2 TIMES DAILY
Qty: 60 CAPSULE | Refills: 0 | Status: SHIPPED | OUTPATIENT
Start: 2024-12-04 | End: 2024-12-04

## 2024-12-04 RX ORDER — OXYCODONE HYDROCHLORIDE 5 MG/1
TABLET ORAL
Qty: 28 TABLET | Refills: 0 | Status: SHIPPED | OUTPATIENT
Start: 2024-12-04 | End: 2024-12-04

## 2024-12-04 RX ORDER — METHOCARBAMOL 500 MG/1
500 TABLET, FILM COATED ORAL 3 TIMES DAILY PRN
Qty: 40 TABLET | Refills: 0 | Status: SHIPPED | OUTPATIENT
Start: 2024-12-04 | End: 2024-12-04

## 2024-12-04 NOTE — H&P
Angelia Vale  is here for a completion of her perioperative paperwork. she  Is scheduled to undergo Left Shoulder:     58526 Biceps tenodesis  96846 Arthroscopy, decompression of subacromial space with partial acromioplasty with or without coracoacromial release  53519 Arthroscopy, shoulder, debridement, limited on 12/10/2024.      She is a healthy individual and does need clearance for this procedure.     Per preop center, pending 12/5/2024    Risks, indications and benefits of the surgical procedure were discussed with the patient. All questions with regard to surgery, rehab, expected return to functional activities, activities of daily living and recreational endeavors were answered to her satisfaction.    Discussed COVID-19 with the patient, they are aware of our current policies and procedures, were given the option of delaying surgery, and they elect to proceed.    Patient was informed and understands the risks of surgery are greater for patients with a current condition or history of heart disease, obesity, clotting disorders, recurrent infections, steroid use, current or past smoking, and factors such as sedentary lifestyle and noncompliance with medications, therapy or follow-up. The degree of the increased risk is hard to estimate with any degree of precision.    Once no other questions were asked, a brief history and physical exam was then performed.    PAST MEDICAL HISTORY: History reviewed. No pertinent past medical history.  PAST SURGICAL HISTORY: History reviewed. No pertinent surgical history.  FAMILY HISTORY: No family history on file.  SOCIAL HISTORY:   Social History     Socioeconomic History    Marital status:    Tobacco Use    Smoking status: Never    Smokeless tobacco: Never     Social Drivers of Health     Financial Resource Strain: Low Risk  (10/7/2024)    Overall Financial Resource Strain (CARDIA)     Difficulty of Paying Living Expenses: Not hard at all   Food Insecurity: No Food  Insecurity (10/7/2024)    Hunger Vital Sign     Worried About Running Out of Food in the Last Year: Never true     Ran Out of Food in the Last Year: Never true   Transportation Needs: No Transportation Needs (4/25/2022)    Received from [facility]    PRAPARE - Transportation     Lack of Transportation (Medical): No     Lack of Transportation (Non-Medical): No   Physical Activity: Insufficiently Active (10/7/2024)    Exercise Vital Sign     Days of Exercise per Week: 3 days     Minutes of Exercise per Session: 30 min   Stress: Patient Declined (10/7/2024)    [employer] - Occupational Stress Questionnaire     Feeling of Stress : Patient declined   Housing Stability: Unknown (10/7/2024)    Housing Stability Vital Sign     Unable to Pay for Housing in the Last Year: No       MEDICATIONS:   Current Outpatient Medications:     celecoxib (CELEBREX) 200 MG capsule, Take 200 mg by mouth., Disp: , Rfl:     celecoxib (CELEBREX) 200 MG capsule, Take 1 capsule (200 mg total) by mouth 2 (two) times daily., Disp: 60 capsule, Rfl: 6    naproxen (NAPROSYN) 500 MG tablet, Take 500 mg by mouth 2 (two) times daily with meals., Disp: , Rfl:   ALLERGIES:   Review of patient's allergies indicates:   Allergen Reactions    Pcn [penicillins] Hives and Swelling    Penicillins        Review of Systems   Constitution: Negative. Negative for chills, fever and night sweats.   HENT: Negative for congestion and headaches.    Eyes: Negative for blurred vision, left vision loss and right vision loss.   Cardiovascular: Negative for chest pain and syncope.   Respiratory: Negative for cough and shortness of breath.    Endocrine: Negative for polydipsia, polyphagia and polyuria.   Hematologic/Lymphatic: Negative for bleeding problem. Does not bruise/bleed easily.   Skin: Negative for dry skin, itching and rash.   Musculoskeletal: Negative for falls and muscle weakness.   Gastrointestinal:  Negative for abdominal pain and bowel incontinence.   Genitourinary: Negative for bladder incontinence and nocturia.   Neurological: Negative for disturbances in coordination, loss of balance and seizures.   Psychiatric/Behavioral: Negative for depression. The patient does not have insomnia.    Allergic/Immunologic: Negative for hives and persistent infections.     PHYSICAL EXAM:  GEN: A&Ox3, WD WN NAD  HEENT: WNL  CHEST: CTAB, no W/R/R  HEART: RRR, no M/R/G   ABD: Soft, NT ND, BS x4 QUADS  MS: Refer to previous note for detailed MS exam  NEURO: CN II-XII intact       The surgical consent was then reviewed with the patient, who agreed with all the contents of the consent form and it was signed. she was instructed to wait for a phone call from the anesthesia department prior to surgery to discuss past medical history, medications, and clearance. Also, informed she may be required to get additional testing per the anesthesia department prior to having surgery.     PHYSICAL THERAPY:  She was also instructed regarding physical therapy and will begin POD # 1-3. She is doing physical therapy at [facility].    POST OP CARE:instructions were reviewed including care of the wound and dressing after surgery and when she can shower.     PAIN MANAGEMENT: Angelia Vale was also given a pain management regime, which includes the TENS unit given to her by our DME team, along with the education required for its use. She was also instructed regarding the ice wrap that will be in place after surgery and her postoperative pain medications.     PAIN MEDICATION:  Roxicodone 5mg 1-2 po q 4-6 hours prn pain  Phenergan 25 mg one p.o. q.4-6 hours p.r.n. nausea and vomiting.  Celebrex 200 mg BID  Robaxin 500mg TID PRN  Lyrica 75mg BID  Aspirin 325mg daily x 6 weeks for DVT prophylaxis starting on the evening after surgery.    Post op meds to be delivered bedside prior to discharge. Deliver to family if patient  is in surgery at 5pm.     Patient was instructed not to take benzodiazepines with opioid and Lyrica during the perioperative period. Patient denies history of seizures.     Patient will also use bilateral TEDs on lower extremities, SCDs during surgery, and early ambulation post-op. If the patient was previously taking 81mg baby aspirin, they may have been told not to hold for procedure.     Patient was also told to buy over the counter Prilosec medication and take it once daily for GI protection as long as they are taking NSAIDs or Aspirin.    DVT prophylaxis was discussed with the patient today including risk factors for developing DVTs and history of DVTs. The patient was asked if any specific recommendations were given from the doctor/s that did pre-operative surgical clearance. I may have prescribed a mechanical DVT prophylaxis device, , for the above listed patient, to reduce the risk for clot formation and complications that can arise from a DVT. In my professional medical opinion, I consider this medically necessary and have prescribed the device for the purpose of postoperative treatment and rehabilitation. This can treat both the acute and chronic aspects of the inflammatory reaction that accompanies trauma and surgery. Additionally, I am prescribing mechanical DVT prophylaxis because the patient will have restricted mobility post-operatively and is at high risk for DVT. Failure to approve this device will compromise the outcome of this patient's healing process.     Patient was asked if they were taking or using OCP pills or devices. If they answered yes, then they were instructed to stop using OCPs at this pre-operative appointment until 2 months post-op to help prevent DVT development. They understand that there are other forms of birth control that do not involve hormones. They expressed understanding that ignoring/not following this instruction could result in a DVT which could turn into a deadly  pulmonary embolism.      The patient was told that narcotic pain medications may make them drowsy and instructions were given to not sign legal documents, drive or operate heavy machinery, cars, or equipment while under the influence of narcotic medications.     Dr. Elkins was present in clinic during this pre-op evaluation. The patient was offered the opportunity to ask Dr. Elkins any further questions regarding the procedure which may not have been addressed during their previous informed consent discussion. The patient has declined to see Dr. Elkins today.    As there were no other questions to be asked, she was given my business card along with Dr. Elkins business card if she has any questions or concerns prior to surgery or in the postop period.

## 2024-12-06 ENCOUNTER — TELEPHONE (OUTPATIENT)
Dept: SPORTS MEDICINE | Facility: CLINIC | Age: 69
End: 2024-12-06
Payer: MEDICARE

## 2024-12-06 ENCOUNTER — CLINICAL SUPPORT (OUTPATIENT)
Dept: REHABILITATION | Facility: HOSPITAL | Age: 69
End: 2024-12-06
Attending: ORTHOPAEDIC SURGERY
Payer: MEDICARE

## 2024-12-06 DIAGNOSIS — R68.89 IMPAIRED FUNCTION OF UPPER EXTREMITY: Primary | ICD-10-CM

## 2024-12-06 DIAGNOSIS — M54.16 LUMBAR RADICULOPATHY, CHRONIC: ICD-10-CM

## 2024-12-06 DIAGNOSIS — M25.562 LEFT KNEE PAIN, UNSPECIFIED CHRONICITY: ICD-10-CM

## 2024-12-06 NOTE — TELEPHONE ENCOUNTER
Spoke with patient and confirmed we have cancelled her surgery for 12/10 and she will reschedule when she is ready to proceed     ----- Message from Ronald Kohli sent at 12/5/2024  1:30 PM CST -----  Regarding: FW: Appt  Contact: pt @ [phone]    ----- Message -----  From: Rogelio Palomo  Sent: 12/5/2024   8:13 AM CST  To: [facility] Staff  Subject: Appt                                             Pt would like to cancel surgery on 12/10. Pt would reschedule at a later date. Please call pt @ [phone]

## 2025-08-11 ENCOUNTER — OFFICE VISIT (OUTPATIENT)
Dept: URGENT CARE | Facility: CLINIC | Age: 70
End: 2025-08-11
Payer: MEDICARE

## 2025-08-11 VITALS
HEIGHT: 67 IN | SYSTOLIC BLOOD PRESSURE: 145 MMHG | DIASTOLIC BLOOD PRESSURE: 79 MMHG | WEIGHT: 145 LBS | RESPIRATION RATE: 18 BRPM | BODY MASS INDEX: 22.76 KG/M2 | TEMPERATURE: 98 F | OXYGEN SATURATION: 96 % | HEART RATE: 61 BPM

## 2025-08-11 DIAGNOSIS — M79.644 PAIN OF RIGHT THUMB: ICD-10-CM

## 2025-08-11 DIAGNOSIS — M18.11 DEGENERATIVE ARTHRITIS OF THUMB, RIGHT: Primary | ICD-10-CM

## 2025-08-11 PROBLEM — R26.89 DECREASED FUNCTIONAL MOBILITY: Status: ACTIVE | Noted: 2017-01-26

## 2025-08-11 PROBLEM — H04.123 DRY EYE SYNDROME OF BOTH EYES: Status: ACTIVE | Noted: 2023-06-05

## 2025-08-11 PROBLEM — H69.92 DYSFUNCTION OF LEFT EUSTACHIAN TUBE: Status: ACTIVE | Noted: 2025-08-11

## 2025-08-11 PROBLEM — J31.0 CHRONIC RHINITIS: Status: ACTIVE | Noted: 2025-08-11

## 2025-08-11 PROBLEM — R43.0 ANOSMIA: Status: ACTIVE | Noted: 2025-08-11

## 2025-08-11 PROBLEM — H10.13 ALLERGIC CONJUNCTIVITIS OF BOTH EYES: Status: ACTIVE | Noted: 2025-08-11

## 2025-08-11 PROBLEM — R26.81 GAIT INSTABILITY: Status: ACTIVE | Noted: 2017-01-26

## 2025-08-11 PROBLEM — H92.02 LEFT EAR PAIN: Status: ACTIVE | Noted: 2025-08-11

## 2025-08-11 PROBLEM — J32.2 CHRONIC ETHMOIDAL SINUSITIS: Status: ACTIVE | Noted: 2025-08-11

## 2025-08-11 PROBLEM — J32.9 CHRONIC SINUSITIS: Status: ACTIVE | Noted: 2025-08-11

## 2025-08-11 PROBLEM — H52.03 HYPEROPIA OF BOTH EYES: Status: ACTIVE | Noted: 2023-06-05

## 2025-08-11 PROBLEM — H40.052 OCULAR HYPERTENSION OF LEFT EYE: Status: ACTIVE | Noted: 2024-07-01

## 2025-08-11 PROBLEM — J30.1 ALLERGIC RHINITIS DUE TO POLLEN: Status: ACTIVE | Noted: 2025-08-11

## 2025-08-11 PROBLEM — J33.9 NASAL POLYPOSIS: Status: ACTIVE | Noted: 2025-08-11

## 2025-08-11 PROBLEM — R09.81 NASAL CONGESTION: Status: ACTIVE | Noted: 2025-08-11

## 2025-08-11 PROBLEM — M25.612 DECREASED RANGE OF MOTION OF LEFT SHOULDER: Status: ACTIVE | Noted: 2022-04-18

## 2025-08-11 PROCEDURE — 99203 OFFICE O/P NEW LOW 30 MIN: CPT | Mod: S$GLB,,,

## 2025-08-11 RX ORDER — FLUTICASONE PROPIONATE 50 MCG
SPRAY, SUSPENSION (ML) NASAL
COMMUNITY
Start: 2025-03-26

## 2025-08-11 RX ORDER — MOXIFLOXACIN 5 MG/ML
SOLUTION/ DROPS OPHTHALMIC
COMMUNITY
Start: 2025-03-26

## 2025-08-11 RX ORDER — DUPILUMAB 300 MG/2ML
INJECTION, SOLUTION SUBCUTANEOUS
COMMUNITY
Start: 2025-03-26 | End: 2026-02-24

## 2025-08-11 RX ORDER — LEVOCETIRIZINE DIHYDROCHLORIDE 5 MG/1
TABLET, FILM COATED ORAL
COMMUNITY
Start: 2025-06-11 | End: 2026-06-06

## 2025-08-11 RX ORDER — CELECOXIB 200 MG/1
200 CAPSULE ORAL DAILY
Qty: 30 CAPSULE | Refills: 0 | Status: SHIPPED | OUTPATIENT
Start: 2025-08-11 | End: 2025-09-10

## 2025-08-11 RX ORDER — DICLOFENAC SODIUM 10 MG/G
2 GEL TOPICAL DAILY
Qty: 20 G | Refills: 0 | Status: SHIPPED | OUTPATIENT
Start: 2025-08-11 | End: 2025-09-10

## 2025-08-11 RX ORDER — OLOPATADINE HYDROCHLORIDE 1 MG/ML
SOLUTION OPHTHALMIC
COMMUNITY
Start: 2025-03-26

## 2025-08-11 RX ORDER — METHYLPREDNISOLONE 4 MG/1
TABLET ORAL
COMMUNITY
Start: 2025-03-26